# Patient Record
Sex: MALE | Race: WHITE | Employment: FULL TIME | URBAN - METROPOLITAN AREA
[De-identification: names, ages, dates, MRNs, and addresses within clinical notes are randomized per-mention and may not be internally consistent; named-entity substitution may affect disease eponyms.]

---

## 2019-08-16 PROBLEM — M54.30 SCIATICA: Status: ACTIVE | Noted: 2019-08-16

## 2020-10-16 ENCOUNTER — HOSPITAL ENCOUNTER (OUTPATIENT)
Dept: NON INVASIVE DIAGNOSTICS | Age: 70
Discharge: HOME | End: 2020-10-16
Payer: MEDICARE

## 2020-10-16 LAB
LV EF: 58 %
LVEF MODALITY: NORMAL

## 2020-10-16 PROCEDURE — 93306 TTE W/DOPPLER COMPLETE: CPT

## 2021-07-19 ENCOUNTER — HOSPITAL ENCOUNTER (OUTPATIENT)
Dept: PHYSICAL THERAPY | Facility: CLINIC | Age: 71
Setting detail: THERAPIES SERIES
Discharge: HOME | End: 2021-07-19
Payer: MEDICARE

## 2021-07-19 PROCEDURE — 97110 THERAPEUTIC EXERCISES: CPT

## 2021-07-19 PROCEDURE — 97161 PT EVAL LOW COMPLEX 20 MIN: CPT

## 2021-07-19 NOTE — CONSULTS
[] [facility]) [facility] &  Therapy  [address]  [phone]  F: [phone] [] [address]  [facility]   [address]  P: [phone]  F: [phone] [x] [address] &  Therapy  [address]  P: [phone]  F: [phone] [] [address]  P: [phone]  F: [phone] [] [address]  [facility]   [address]   [place]: [phone]  F: [phone]      Physical Therapy Lower Extremity Evaluation    Date:  2021  Patient: Cayetano Hernandez  : 1950  MRN: [id number]  Physician: [address] [place] [address] Avenue [address]: [facility] Medicare  Medical Diagnosis: Pain in (L) hip  Rehab Codes: M25.552  Onset date: 2021  Next Dr's appt.: PRN    Subjective:   CC/HPI: Pt reports that he has had pain in (L) hip and going down (L) leg. Patient reports that he started to notice pain in April when sitting on hard chairs or when driving for long periods of time. Pt reports that pain decreases with getting up and walking around for a few minutes. Pt reports he was concerned that he would need a hip replacement so he scheduled an appointment with Dr. Derrick Landaverde on 2021 and X-rays were taken with negative results. Pt was then referred to physical therapy at that time. Hx of partial knee replacement within (B) knees.      PMHx: [] Unremarkable [] Diabetes [x] HTN  [] Pacemaker   [] MI/Heart Problems [] Cancer [] Arthritis [] Other:              [] Refer to full medical chart  In EPIC       Comorbidities:   [] Obesity [] Dialysis  [] N/A   [] Asthma/COPD [] Dementia [] Other:   [] Stroke [] Sleep apnea [] Other:   [] Vascular disease [] Rheumatic disease [] Other:     Tests: [x] X-Ray:  [] MRI:  [] Other:    Medications: [x] Refer to full medical record [] None [] Other:  Allergies:      [x] Refer to full medical record [] None [] Other:    Function:  Hand Dominance  [] Right  [] Left  Patient lives with: Wife   In what type of home [x]  One story   [] Two story   [] Split level   Number of stairs to enter 4-5    With handrail on the []  Right to enter   [x] Left to enter   Bathroom has a []  Tub only  [] Tub/shower combo   [] Walk in shower    []  Grab bars   Washing machine is on []  Main level   [] Second level   [] Basement   Employer    Job Status [x]  Normal duty   [] Light duty   [] Off due to condition    []  Retired   [] Not employed   [] Disability  [] Other:  []  Return to work:    Work activities/duties Computer job, sitting for long periods       ADL/IADL Previous level of function Current level of function Who currently assists the patient with task   Bathing  [x] Independent  [] Assist [x] Independent  [] Assist    Dress/grooming [x] Independent  [] Assist [x] Independent  [] Assist    Transfer/mobility [x] Independent  [] Assist [x] Independent  [] Assist    Feeding [] Independent  [] Assist [] Independent  [] Assist    Toileting [] Independent  [] Assist [] Independent  [] Assist    Driving [x] Independent  [] Assist [x] Independent  [] Assist Requires multiple stops to stand up and walk around   Housekeeping [x] Independent  [] Assist [x] Independent  [] Assist    Grocery shop/meal prep [x] Independent  [] Assist [x] Independent  [] Assist      Gait Prior level of function Current level of function    [x] Independent  [] Assist [x] Independent  [] Assist   Device: [] Independent [] Independent    [] Straight Cane [] Quad cane [] Straight Cane [] Quad cane    [] Standard walker [] Rolling walker   [] 4 wheeled walker [] Standard walker [] Rolling walker   [] 4 wheeled walker    [] Wheelchair [] Wheelchair     Pain:  [x] Yes  [] No Location: (L) hip    Pain Rating: (0-10 scale) 5-6/10  Pain altered Tx:  [] Yes  [x] No  Action:    Symptoms:  [] (L) hip pain with peripheralization, decreased hip flexor and piriformis mobility and decreased (L) hip strength. These impairments affect the patient's sitting and driving tolerance. Patient would benefit from physical therapy in order to address these impairments to improve overall quality of life. Educated pt on utilizing tennis ball for self mobilization of piriformis when driving for long periods of time. Educated pt on impairments and activity limitations in order for patient to understand pain symptoms. Patient verbalized good understanding of education. Problems:    [x] ? Pain:  [x] ? ROM:  [x] ? Strength:  [x] ? Function:  [] Other:       STG: (to be met in 5 treatments)  1. ? Pain: Pt will report pain at the worst as 2/10 in order to improve sitting tolerance at work and while driving. 2. ? ROM: Pt will improve knee flexion in prone to 120 deg indicating an increase in hip flexor length  3. ? Strength: Patient will improve all (L) hip strength to 4/5 in order to assist with ADLs  4. ? Function: Pt will report being able to sit or drive in car for 30 min with no increase in (L) hip symptoms. 5. Patient to be independent with home exercise program as demonstrated by performance with correct form without cues. 6. Demonstrate Knowledge of fall prevention  LTG: (to be met in 10 treatments)  1. Patient will report being able to sit or drive in car for an hour with no increase in (L) hip symptoms. 2. Patient will demonstrate equal and consistent height with bridging for 10 reps indication increase in hip strength. Patient goals:  \"Eliminate pain while driving\"    Rehab Potential:  [x] Good  [] Fair  [] Poor   Suggested Professional Referral:  [x] No  [] Yes:  Barriers to Goal Achievement:  [x] No  [] Yes:  Domestic Concerns:  [x] No  [] Yes:    Pt. Education:  [x] Plans/Goals, Risks/Benefits discussed  [x] Home exercise program    Method of Education: [x] Verbal  [x] Demo  [x] Written  Comprehension of Education:  [x] Verbalizes understanding. [x] Demonstrates understanding. [x] Needs Review. [x] Demonstrates/verbalizes understanding of HEP/Ed previously given. Treatment Plan:  [x] Therapeutic Exercise   50435  [] Iontophoresis: 4 mg/mL Dexamethasone Sodium Phosphate  mAmin  13754   [x] Therapeutic Activity  42332 [] Vasopneumatic cold with compression  12611    [] Gait Training   59500 [] Ultrasound   41291   [x] Neuromuscular Re-education  83447 [] Electrical Stimulation Unattended  32118   [x] Manual Therapy  01392 [] Electrical Stimulation Attended  87070   [x] Instruction in HEP  [] Lumbar/Cervical Traction  65603   [] Aquatic Therapy   52612 [x] Cold/hotpack    [] Massage   89871      [] Dry Needling, 1 or 2 muscles  04937   [] Biofeedback, first 15 minutes   65389  [] Biofeedback, additional 15 minutes   91834 [] Dry Needling, 3 or more muscles  91883     [x]  Medication allergies reviewed for use of    Dexamethasone Sodium Phosphate 4mg/ml     with iontophoresis treatments. Pt is not allergic. Frequency:  2 x/week for 10 visits        Todays Treatment:  Modalities:   Precautions:  Exercises:  Exercise Reps/ Time Weight/ Level Comments   Figure 4 S 30\"x3     Piriformis S 30\"x3     Supine Hip Flex S 30\"x3           Bridges 2x10     Other:    Specific Instructions for next treatment: Continue with (L) hip and piriformis mobility and (L) hip strengthening.        Evaluation Complexity:  History (Personal factors, comorbidities) [x] 0 [] 1-2 [] 3+   Exam (limitations, restrictions) [x] 1-2 [] 3 [] 4+   Clinical presentation (progression) [x] Stable [] Evolving  [] Unstable   Decision Making [x] Low [] Moderate [] High    [x] Low Complexity [] Moderate Complexity [] High Complexity       Treatment Charges: Mins Units   [x] Evaluation       [x]  Low       []  Moderate       []  High 35 1   []  Modalities     [x]  Ther Exercise 15 2   []  Manual Therapy     []  Ther Activities     []  Aquatics     []  Vasocompression     []  Other       TOTAL TREATMENT TIME: 50    Time in: 10:00   Time Out: 11:00    Electronically signed by: Griselda Drew PT        Physician Signature:________________________________Date:__________________  By signing above or cosigning this note, I have reviewed this plan of care and certify a need for medically necessary rehabilitation services.      *PLEASE SIGN ABOVE AND FAX BACK ALL PAGES*

## 2021-07-22 ENCOUNTER — HOSPITAL ENCOUNTER (OUTPATIENT)
Dept: PHYSICAL THERAPY | Facility: CLINIC | Age: 71
Setting detail: THERAPIES SERIES
Discharge: HOME | End: 2021-07-22
Payer: MEDICARE

## 2021-07-22 PROCEDURE — 97110 THERAPEUTIC EXERCISES: CPT

## 2021-07-22 NOTE — FLOWSHEET NOTE
[] [facility]) - [facility] &  Therapy  [address]  [phone]  F: [phone] [] [address]  [place] [address]   [address]  P: [phone]  F: [phone] [x] [address] &  Therapy  [address]  P: [phone]  F: [phone] [] [address]  P: [phone]  F: [phone] [] [address]  [facility]   [address]   [place]: [phone]  F: [phone]      Physical Therapy Daily Treatment Note    Date:  2021  Patient Name:  Michael Yuan    :  1950  MRN: [id number]   Physician: [address]: [place] Medicare  Medical Diagnosis: Pain in (L) hip                Rehab Codes: M25.552  Onset date: 2021             Next 's appt.: PRN  Visit# / total visits: 2/10     Cancels/No Shows: 0/0    Subjective:    Pain:  [x] Yes  [] No Location: (L) hip Pain Rating: (0-10 scale) 4/10  Pain altered Tx:  [x] No  [] Yes  Action:  Comments: Patient reports that he is feeling good today. Pt was able to complete HEP at home and reports that stretches feel good. Patient continues to report increased pain within the (L) hip and down the (L) LE after about 45 min of driving.       Objective:  Modalities:   Precautions:  Exercises:  Exercise Reps/ Time Weight/ Level Comments   Nustep 10'           SB S 30\"x3     Hip Flexor S 30\"x3     HS Step S 30\"x3     Figure 4 S 30\"x3     Piriformis S 30\"x3           Bridges 2x10     SL Hip Abd 2x10     SL Clamshells 2x10     Prone Hip Ext 2x10     TA iso 5\"x10     Other: Hypervolt to (L) piriformis-5'      Treatment Charges: Mins Units   []  Modalities     [x]  Ther Exercise 50 3   [x]  Manual Therapy 5 -   []  Ther Activities     []  Aquatics     []  Vasocompression     []  Other Total Treatment time 55 3       Assessment: [x] Progressing toward goals. Initiated exercise program during this session per flow sheet listed above in order to improve mobility within the hip, strengthen (L) hip and strengthen core. Followed therapeutic exercise with manual to piriformis utilizing Hypervolt to improve piriformis mobility. VC/TC utilized for TA iso in order to improve core activation with good understanding from patient. Patient reports no increased pain after session at this time. [] No change. [] Other:  [x] Patient would continue to benefit from skilled physical therapy services in order to decrease pain, improve LE strength and increase core strength. STG: (to be met in 5 treatments)  1. ? Pain: Pt will report pain at the worst as 2/10 in order to improve sitting tolerance at work and while driving. 2. ? ROM: Pt will improve knee flexion in prone to 120 deg indicating an increase in hip flexor length  3. ? Strength: Patient will improve all (L) hip strength to 4/5 in order to assist with ADLs  4. ? Function: Pt will report being able to sit or drive in car for 30 min with no increase in (L) hip symptoms. 5. Patient to be independent with home exercise program as demonstrated by performance with correct form without cues. 6. Demonstrate Knowledge of fall prevention  LTG: (to be met in 10 treatments)  1. Patient will report being able to sit or drive in car for an hour with no increase in (L) hip symptoms. 2. Patient will demonstrate equal and consistent height with bridging for 10 reps indication increase in hip strength.        Pt. Education:  [x] Yes  [] No  [x] Reviewed Prior HEP/Ed  Method of Education: [x] Verbal  [] Demo  [] Written  Comprehension of Education:  [x] Verbalizes understanding. [x] Demonstrates understanding. [x] Needs review. [] Demonstrates/verbalizes HEP/Ed previously given. Plan: [x] Continue current frequency toward long and short term goals. [] Specific Instructions for subsequent treatments:       Time In: 10:30 am            Time Out: 11:30    Electronically signed by:  Kendal Warner PT

## 2021-07-26 ENCOUNTER — APPOINTMENT (OUTPATIENT)
Dept: PHYSICAL THERAPY | Facility: CLINIC | Age: 71
End: 2021-07-26
Payer: MEDICARE

## 2021-07-28 ENCOUNTER — HOSPITAL ENCOUNTER (OUTPATIENT)
Dept: PHYSICAL THERAPY | Facility: CLINIC | Age: 71
Setting detail: THERAPIES SERIES
Discharge: HOME | End: 2021-07-28
Payer: MEDICARE

## 2021-07-28 PROCEDURE — 97140 MANUAL THERAPY 1/> REGIONS: CPT

## 2021-07-28 PROCEDURE — 97110 THERAPEUTIC EXERCISES: CPT

## 2021-07-28 NOTE — FLOWSHEET NOTE
[] [facility]) [facility] &  Therapy  [address]  [phone]  F: [phone] [] [address]  [place] [address]   [address]  P: [phone]  F: [phone] [x] [address] &  Therapy  [address]  P: [phone]  F: [phone] [] [address]  P: [phone]  F: [phone] [] [address]  [facility]   [address]   [place]: [phone]  F: [phone]      Physical Therapy Daily Treatment Note    Date:  2021  Patient Name:  Celeste Kerns    :  1950  MRN: [id number]   Physician: [address]: [facility] Medicare  Medical Diagnosis: Pain in (L) hip                Rehab Codes: M25.552  Onset date: 2021             Next 's appt.: PRN  Visit# / total visits: 3/10     Cancels/No Shows: 0/0    Subjective:    Pain:  [x] Yes  [] No Location: (L) hip Pain Rating: (0-10 scale) 4/10  Pain altered Tx:  [x] No  [] Yes  Action:  Comments: Patient reports that he feels there has been some improvement in (L) hip and piriformis pain since beginning therapy. Patient states that he is not feeling any sharp pain down the leg when driving to therapy session, which is about 45 minutes.      Objective:  Modalities:   Precautions:  Exercises:  Exercise Reps/ Time Weight/ Level Comments   Nustep 10'           SB S 30\"x3     Hip Flexor S 30\"x3     HS Step S 30\"x3     Figure 4 S 30\"x3     Piriformis S 30\"x3           Bridges 2x10  HEP, Held this date   SL Hip Abd 2x10  HEP   SL Clamshells 2x10     Prone Hip Ext 2x10     3-way hip x10ea Lime    Step ups: Fwd/Lat      TA iso 5\"x10  Held this date   Other: Hypervolt to (L) piriformis-10'      Treatment Charges: Mins Units   []  Modalities     [x]  Ther Exercise 45 3   [x] Manual Therapy 10 1   []  Ther Activities     []  Aquatics     []  Vasocompression     []  Other     Total Treatment time 55 4       Assessment: [x] Progressing toward goals. Continued with current exercise program per flowsheet listed above. Began session with Nustep to warm up muscles and increase blood flow. Followed with stretching to improve mobility of (B) hips and reduce pain. Continued with manual therapy via the Hypervolt to the (L) piriformis and posterior hip in order to improve mobility within the piriformis. Initiated standing LE strengthening ex during this session to improve stability within (L) hip. Patient tolerated added ex well with reports of increased fatigue at end of session. [] No change. [] Other:  [x] Patient would continue to benefit from skilled physical therapy services in order to decrease pain, improve LE strength and increase core strength. STG: (to be met in 5 treatments)  1. ? Pain: Pt will report pain at the worst as 2/10 in order to improve sitting tolerance at work and while driving. 2. ? ROM: Pt will improve knee flexion in prone to 120 deg indicating an increase in hip flexor length  3. ? Strength: Patient will improve all (L) hip strength to 4/5 in order to assist with ADLs  4. ? Function: Pt will report being able to sit or drive in car for 30 min with no increase in (L) hip symptoms. 5. Patient to be independent with home exercise program as demonstrated by performance with correct form without cues. 6. Demonstrate Knowledge of fall prevention  LTG: (to be met in 10 treatments)  1. Patient will report being able to sit or drive in car for an hour with no increase in (L) hip symptoms. 2. Patient will demonstrate equal and consistent height with bridging for 10 reps indication increase in hip strength.        Pt.  Education:  [x] Yes  [] No  [x] Reviewed Prior HEP/Ed  Method of Education: [x] Verbal  [] Demo  [] Written    Updated HEP during this session, adding mat hip strengthening exercises with good understanding from patient. Comprehension of Education:  [x] Verbalizes understanding. [x] Demonstrates understanding. [x] Needs review. [] Demonstrates/verbalizes HEP/Ed previously given. Plan: [x] Continue current frequency toward long and short term goals.     [] Specific Instructions for subsequent treatments:       Time In: 8:00 am            Time Out: 9:00 am    Electronically signed by:  Dane Elizalde PT

## 2021-08-02 ENCOUNTER — HOSPITAL ENCOUNTER (OUTPATIENT)
Dept: PHYSICAL THERAPY | Facility: CLINIC | Age: 71
Setting detail: THERAPIES SERIES
Discharge: HOME | End: 2021-08-02
Payer: MEDICARE

## 2021-08-02 PROCEDURE — 97110 THERAPEUTIC EXERCISES: CPT

## 2021-08-02 NOTE — FLOWSHEET NOTE
[] [facility]) - [facility] &  Therapy  [address]  [phone]  F: [phone] [] [address]  [place] [address]   [address]  P: [phone]  F: [phone] [x] [address] &  Therapy  [address]  P: [phone]  F: [phone] [] [address]  P: [phone]  F: [phone] [] [address]  [facility]   [address]   [place]: [phone]  F: [phone]      Physical Therapy Daily Treatment Note    Date:  2021  Patient Name:  Zhao Garber    :  1950  MRN: [id number]   Physician: [address]: [facility] Medicare  Medical Diagnosis: Pain in (L) hip                Rehab Codes: M25.552  Onset date: 2021             Next 's appt.: PRN  Visit# / total visits: 4/10     Cancels/No Shows: 0/0    Subjective:    Pain:  [] Yes  [x] No Location: (L) hip Pain Rating: (0-10 scale) 0/10  Pain altered Tx:  [x] No  [] Yes  Action:  Comments: Patient reports no increased pain after driving 45 min to clinic today. States that he has some discomfort, however it is not as bad as it has been.      Objective:  Modalities:   Precautions:  Exercises:  Exercise Reps/ Time Weight/ Level Comments   Nustep 10'           SB S 30\"x3     Hip Flexor S 30\"x3     HS Step S 30\"x3     Figure 4 S 30\"x3     Piriformis S 30\"x3           Bridges 2x10  HEP, Held this date   SL Hip Abd 2x10  HEP   SL Clamshells 2x10  HEP   Prone Hip Ext 2x10  HEP   3-way hip x10ea [address]ime    Step ups: Fwd/Lat x15 ea           TA iso 10\"x10     TA iso + marches  x10     TA iso + bent knee fall out x10                 Other: Hypervolt to (L) piriformis-not performed on this date      Treatment Charges: Mins Units   []  Modalities     [x]  Ther Exercise 50 3 Verbal  [] Demo  [] Written    Updated HEP during this session, adding mat hip strengthening exercises with good understanding from patient. Comprehension of Education:  [x] Verbalizes understanding. [x] Demonstrates understanding. [x] Needs review. [] Demonstrates/verbalizes HEP/Ed previously given. Plan: [x] Continue current frequency toward long and short term goals.     [] Specific Instructions for subsequent treatments:       Time In: 8:00 am            Time Out: 9:00 am    Electronically signed by:  Gabe Martinez PT

## 2021-08-04 ENCOUNTER — HOSPITAL ENCOUNTER (OUTPATIENT)
Dept: PHYSICAL THERAPY | Facility: CLINIC | Age: 71
Setting detail: THERAPIES SERIES
Discharge: HOME | End: 2021-08-04
Payer: MEDICARE

## 2021-08-04 PROCEDURE — 97530 THERAPEUTIC ACTIVITIES: CPT

## 2021-08-04 PROCEDURE — 97110 THERAPEUTIC EXERCISES: CPT

## 2021-08-04 NOTE — FLOWSHEET NOTE
[] [facility]) - [facility] &  Therapy  [address]  [phone]  F: [phone] [] [address]  [place] [address]   [address]  P: [phone]  F: [phone] [x] [address] &  Therapy  [address]  P: [phone]  F: [phone] [] [address]  P: [phone]  F: [phone] [] [address]  [facility]   [address]   [place]: [phone]  F: [phone]      Physical Therapy Daily Treatment Note/Progress Note    Date:  2021  Patient Name:  Cayetano Hernandez    :  1950  MRN: [id number]   Physician: [address]: [place] [employer]  Medical Diagnosis: Pain in (L) hip                Rehab Codes: M25.552  Onset date: 2021             Next Dr's appt.: PRN  Visit# / total visits: 5/10     Cancels/No Shows: 0/0    Subjective:    Pain:  [] Yes  [x] No Location: (L) hip Pain Rating: (0-10 scale) 0/10  Pain altered Tx:  [x] No  [] Yes  Action:  Comments: Patient reports that he feels he is getting better since beginning physical therapy. Patient reports that he can notice his hip after driving 45 min to 1 hour, however pain has decreased compared to first coming to therapy. Objective:    ROM  ° A/P STRENGTH TESTS (+/-) Left Right Not Tested     Left Right Left Right Ant.  Drawer     []?    Hip Flex 100 100 4/5 5/5 Post. Drawer     []?    Ext 10 10 4+/5 4+/5 Lachmans     []?    ER         Valgus Stress     []?    IR         Varus Stress     []?    ABD 30 30 4+/5 5/5 Polas     []?    ADD         Apleys Comp.     []?    Knee Flex 125 120 4+/5 5/5 Apleys Dist.     []?    Ext     5/5 5/5 Hip Scouring (-) (-) []?    Ankle DF 5 5  JEFFREYs (-) (-) []?    PF 5 5     Piriformis (-) (-) []?    INV         Fabios     []?    EVER         Talor Tilt     []?              Pat-Fem Grind     []?              Ely's (+) 115 deg knee flx (+) 110 deg knee flx         Modalities:   Precautions:  Exercises:   Exercise Reps/ Time Weight/ Level Exercise Performed  Comments   Nustep 10'   x                SB S 30\"x3        Hip Flexor S 30\"x3        HS Step S 30\"x3        Figure 4 S 30\"x3   x     Piriformis S 30\"x3   x                Bridges 2x10   x HEP   SL Hip Abd 2x10  Lime x HEP   SL Clamshells 2x10  Lime x HEP   Prone Hip Ext 2x10   x HEP   3-way hip 2x10ea Lime x     Step ups: Fwd/Lat x15 ea  8' x     TG SL Squat x20 L20 x           TA iso 10\"x10        TA iso + marches  x10        TA iso + bent knee fall out x10            Other: Hypervolt to (L) piriformis-not performed on this date      Treatment Charges: Mins Units   []  Modalities     [x]  Ther Exercise 40 3   []  Manual Therapy     [x]  Ther Activities 15 1   []  Aquatics     []  Vasocompression     []  Other     Total Treatment time 55 4       Assessment: [x] Progressing toward goals. Re-assessed patient's goals during this session with good progress being made. Patient met 4/6 short term goals and 0/2 long term goals. Patient has demonstrated improvements in (B) hip strength, (B) hip flexor length, pain tolerance and sitting tolerance. Patient would continue to benefit from skilled physical therapy in order to continue to improve (B) hip strength, (B) hip flexor length, sitting tolerance, sore strength and functional hip strength. Continued with current POC per flowsheet listed above. Began session with Nustep in order to increase blood flow and warm up muscles. Followed with piriformis stretching to increase piriformis length. Progressed (B) hip strengthening ex by increasing resistance with mat ex and adding TG SL squat. Patient tolerated added resistance and ex well with no increase in (L) hip pain.  Plan to continue per POC with (L) hip and complete re-evaluation at next appointment for (R) shoulder. [] No change. [] Other:  [x] Patient would continue to benefit from skilled physical therapy services in order to decrease pain, improve LE strength and increase core strength. STG: (to be met in 5 treatments)  1. ? Pain: Pt will report pain at the worst as 2/10 in order to improve sitting tolerance at work and while driving. (7/1/63: Patient reports pain at worst as 2-3/10 when driving for long periods of time.) (MET)  2. ? ROM: Pt will improve knee flexion in prone to 120 deg indicating an increase in hip flexor length (8/4/21: Patient demonstrates 115 deg of (R) knee flex in prone and 110 deg of (L) knee flex in prone) (NOT MET)  3. ? Strength: Patient will improve all (L) hip strength to 4/5 in order to assist with ADLs (8/4/21: Patient has improved (B) hip strength to at least 4/5) (MET)  4. ? Function: Pt will report being able to sit or drive in car for 30 min with no increase in (L) hip symptoms. (8/4/21: Patient reports being able to sit in car up to 45 min without pain) (MET)  5. Patient to be independent with home exercise program as demonstrated by performance with correct form without cues. (8/4/21: Patient reports he completes HEP 2-3x/week) (Ongoing)  6. Demonstrate Knowledge of fall prevention (MET)  LTG: (to be met in 10 treatments)  1. Patient will report being able to sit or drive in car for an hour with no increase in (L) hip symptoms. 2. Patient will demonstrate equal and consistent height with bridging for 10 reps indication increase in hip strength. (8/4/21: Patient continues to demonstrate decreased bridge height with increased repetitions) (NOT MET)       Pt. Education:  [x] Yes  [] No  [x] Reviewed Prior HEP/Ed  Method of Education: [x] Verbal  [] Demo  [] Written    Updated HEP during this session, adding mat hip strengthening exercises with good understanding from patient. Comprehension of Education:  [x] Verbalizes understanding.   [x] Demonstrates understanding. [x] Needs review. [] Demonstrates/verbalizes HEP/Ed previously given. Plan: [x] Continue current frequency toward long and short term goals.     [] Specific Instructions for subsequent treatments:       Time In: 9:05 am            Time Out: 10:00 am    Electronically signed by:  Liseth Chandra PT

## 2021-08-11 ENCOUNTER — APPOINTMENT (OUTPATIENT)
Dept: PHYSICAL THERAPY | Facility: CLINIC | Age: 71
End: 2021-08-11
Payer: MEDICARE

## 2021-08-16 ENCOUNTER — HOSPITAL ENCOUNTER (OUTPATIENT)
Dept: PHYSICAL THERAPY | Facility: CLINIC | Age: 71
Setting detail: THERAPIES SERIES
Discharge: HOME | End: 2021-08-16
Payer: MEDICARE

## 2021-08-16 PROCEDURE — 97110 THERAPEUTIC EXERCISES: CPT

## 2021-08-16 PROCEDURE — 97161 PT EVAL LOW COMPLEX 20 MIN: CPT

## 2021-08-16 NOTE — FLOWSHEET NOTE
X-Ray: [] MRI:  [] Other:    Medications: [x] Refer to full medical record [] None [] Other:  Allergies:      [x] Refer to full medical record [] None [] Other:    Function:  Hand Dominance  []? Right  []? Left  Patient lives with: Wife   In what type of home [x]? One story   []? Two story   []? Split level   Number of stairs to enter 4-5    With handrail on the []? Right to enter   [x]? Left to enter   Bathroom has a []? Tub only  []? Tub/shower combo   []? Walk in shower    []? Grab bars   Washing machine is on []? Main level   []? Second level   []? Basement   Employer     Job Status [x]? Normal duty   []? Light duty   []? Off due to condition    []? Retired   []? Not employed   []? Disability  []? Other:  []? Return to work:    Work activities/duties Computer job, sitting for long periods            ADL/IADL Previous level of function Current level of function Who currently assists the patient with task   Bathing  [x] Independent  [] Assist [x] Independent  [] Assist    Dress/grooming [x] Independent  [] Assist [x] Independent  [] Assist    Transfer/mobility [x] Independent  [] Assist [x] Independent  [] Assist    Feeding [x] Independent  [] Assist [x] Independent  [] Assist    Toileting [x] Independent  [] Assist [x] Independent  [] Assist    Driving [x] Independent  [] Assist [x] Independent  [] Assist Patient reports (R) shoulder pain after 20 min of driving    Housekeeping [x] Independent  [] Assist [x] Independent  [] Assist    Grocery shop/meal prep [x] Independent  [] Assist [x] Independent  [] Assist        Pain:  [x] Yes  [] No Location: (R) shoulder Pain Rating: (0-10 scale) 3-4/10  Pain altered Tx:  [] Yes  [x] No  Action:    Symptoms:  [] Improving [x] Worsening [] Same  Better:  [] AM    [] PM    [] Sit    [] Rise/Sit    []Stand    [] Walk    [] Lying    [x] Other: Resting   Worse: [] AM    [] PM    [] Sit    [] Rise/Sit    []Stand    [] Walk    [] Lying    [] Bend                      [] Valsalva    [x] Other: Driving for long periods of time   Sleep: [x] OK    [] Disturbed    Objective:     ROM  °A/P END FEEL STRENGTH    Left Right  Left Right   Shoulder Flex 150 140  5/5 4+/5   Ext         175  5/5 5/5   ER @ 0 45 90 WFL WFL  4/5 3+/5   IR WFL WFL  5/5 5/5   Supraspinatus    5/5 4+/5   Infraspinatus    4/5 3+/5   Serratus Ant    5/5 4+/5   Pectoralis        Lats        Mid Trap    4+/5 3+/5   Lower Trap    3+/5 3+/5   Elbow Flex. [facility] WFL  4/5 4/5   Elbow Ext. [facility]  4/5 4/5   Wrist Flex. [facility]  5/5 5/5   Wrist Ext. [facility] WF  5/5 5/5               OBSERVATION No Deficit Deficit Not Tested Comments   Forward Head [] [x] []    Rounded Shoulders [] [x] []    Kyphosis [] [x] []    Scapular Height/Position [] [x] [] (R) scapula elevated and upwardly rotated    Winging [x] [] []    Scapulohumeral Rhythm [] [x] []    INSPECTION/PALPATION       SC/AC Joint [x] [] []    Supraspinatus [x] [] []    Biceps tendon/groove [x] [] []    Posterior shld [] [x] [] Increased tension palpated within (R) UT and middle trap and lower trap   Subscapularis [x] [] []    NEUROLOGICAL       Cervical ROM/Quadrant [] [x] [] Flex: 20 deg, Ext: 25 deg, (R) rot: 55 (L) Rot: 50 (R) SB: 15, (L) SB: 15   Reflexes [x] [] []    Compression/Distraction [x] [] []    Sensation [x] [] []      Functional Test: UEFI Score: 3% functionally impaired     Comments:    Assessment:  Patient is a 66year old male who presents to physical therapy with increased pain in (R) shoulder, decreased cervical ROM and decreased shoulder and scapular strength. These impairments affect the patient's ability to drive greater than 20 min without (R) shoulder pain. Patient would benefit from skilled physical therapy in order to improve impairments and overall quality of life. Educated patient on findings and POC with good understanding verbalized by patient. Problems:    [x] ? Pain:  [x] ? ROM:  [x] ? Strength:  [x] ?  Function:  [] Other: STG: (to be met in 5 treatments)  1. ? Pain: Patient will report pain as 0/10 in order to perform ADLs with ease   2. ? ROM: Patient will improve (B) cervical SB ROM to 45 deg to indicate improved length of (B) UT  3. Patient to be independent with home exercise program as demonstrated by performance with correct form without cues. LTG: (to be met in 10 treatments)  1. Patient will improve UEFI to 0% in order to improve overall quality of life  2. ? Function: Patient will report being able to drive up to 45 min without pain in (R) shoulder in order to improve driving tolerance. 3. ? Strength: Patient will improve (B) shoulder and scapular stabilizer strength to 4+/5 in order to improve driving tolerance of (R) shoulder                       Patient goals: \"Finding out what is wrong with shoulder, relieving pain\"    Rehab Potential:  [x] Good  [] Fair  [] Poor   Suggested Professional Referral:  [x] No  [] Yes:  Barriers to Goal Achievement:  [x] No  [] Yes:  Domestic Concerns:  [x] No  [] Yes:    Pt. Education:  [x] Plans/Goals, Risks/Benefits discussed  [x] Home exercise program  Access Code: [email or web address]  URL: [email or web address]  Date: 08/16/2021  Prepared by: Kym Nichole    Exercises  Seated Upper Trapezius Stretch - 2 x daily - 7 x weekly - 3 reps - 30 hold  Seated Levator Scapulae Stretch - 2 x daily - 7 x weekly - 3 reps - 30 sec hold  Prone Single Arm Shoulder Y - 2 x daily - 5 x weekly - 3 sets - 10 reps  Prone Shoulder Horizontal Abduction - 2 x daily - 5 x weekly - 3 sets - 10 reps  Prone Shoulder Flexion - 2 x daily - 5 x weekly - 3 sets - 10 reps  Sidelying Shoulder External Rotation - 2 x daily - 5 x weekly - 3 sets - 10 reps    Method of Education: [x] Verbal  [x] Demo  [x] Written  Comprehension of Education:  [x] Verbalizes understanding. [x] Demonstrates understanding. [x] Needs Review. [] Demonstrates/verbalizes understanding of HEP/Ed previously given.     Treatment Plan:  [x] Therapeutic Exercise   68052  [] Iontophoresis: 4 mg/mL Dexamethasone Sodium Phosphate  mAmin  75888   [x] Therapeutic Activity  14521 [x] Vasopneumatic cold with compression  45350    [] Gait Training   05389 [] Ultrasound   25293   [] Neuromuscular Re-education  27728 [] Electrical Stimulation Unattended  19175   [x] Manual Therapy  90336 [] Electrical Stimulation Attended  82002   [x] Instruction in HEP  [] Lumbar/Cervical Traction  03569   [] Aquatic Therapy   94241 [x] Cold/hotpack    [] Massage   74716      [] Dry Needling, 1 or 2 muscles  29124   [] Biofeedback, first 15 minutes   09699  [] Biofeedback, additional 15 minutes   64135 [] Dry Needling, 3 or more muscles  62337     [x]  Medication allergies reviewed for use of    Dexamethasone Sodium Phosphate 4mg/ml     with iontophoresis treatments. Pt is not allergic.        Frequency: 2x/week for 10 visits    Todays Treatment:  Modalities:   Precautions:  Exercises:  Exercise Reps/ Time Weight/ Level Comments   UT S 3x30\"     Levator Scap S 3x30\"           SL ER  x15  Slow to complete   IYT x15  Slow to complete   Other:    Specific Instructions for next treatment:      Evaluation Complexity:  History (Personal factors, comorbidities) [x] 0 [] 1-2 [] 3+   Exam (limitations, restrictions) [x] 1-2 [] 3 [] 4+   Clinical presentation (progression) [x] Stable [] Evolving  [] Unstable   Decision Making [x] Low [] Moderate [] High    [x] Low Complexity [] Moderate Complexity [] High Complexity       Treatment Charges: Mins Units   [x] Evaluation       [x]  Low       []  Moderate       []  High 30 1   []  Modalities     [x]  Ther Exercise 20 1   []  Manual Therapy     []  Ther Activities     []  Aquatics     []  Vasocompression     []  Other       TOTAL TREATMENT TIME: 50 min    Time in: 9:05 am    Time Out: 10:00 am    Electronically signed by: Daniel Laguans PT        Physician Signature:________________________________Date:__________________  By signing above or cosigning this note, I have reviewed this plan of care and certify a need for medically necessary rehabilitation services.      *PLEASE SIGN ABOVE AND FAX BACK ALL PAGES*

## 2021-08-18 ENCOUNTER — HOSPITAL ENCOUNTER (OUTPATIENT)
Dept: PHYSICAL THERAPY | Facility: CLINIC | Age: 71
Setting detail: THERAPIES SERIES
Discharge: HOME | End: 2021-08-18
Payer: MEDICARE

## 2021-08-18 PROCEDURE — 97110 THERAPEUTIC EXERCISES: CPT

## 2021-08-18 NOTE — FLOWSHEET NOTE
[] [facility]) - [facility] &  Therapy  [address]  [phone]  F: [phone] [] [address]  [address]   [address]  P: [phone]  F: [phone] [x] [address] &  Therapy  [address]  P: [phone]  F: [phone] [] [address]  P: [phone]  F: [phone] [] [address]  [facility]   [address]   [place]: [phone]  F: [phone]      Physical Therapy Daily Treatment Note/Progress Note    Date:  2021  Patient Name:  Alirio Kraft    :  1950  MRN: [id number]   Physician: [address]: [facility] Medicare  Medical Diagnosis: Pain in (L) hip                Rehab Codes: M25.552  Onset date: 2021             Next 's appt.: PRN  Visit# / total visits: 6/10 (L) hip, 2/10 (R) shoulder     Cancels/No Shows: 0/0    Subjective:    Pain:  [] Yes  [x] No Location: (L) hip Pain Rating: (0-10 scale) 0/10  Pain altered Tx:  [x] No  [] Yes  Action:  Comments: Patient reports that he was able to drive 45 min to therapy with no pain in the (R) shoulder or (L) hip. Patient states he plans to drive 2 1/2 hours this weekend and will gauge his pain during the drive.    Objective:   Modalities:   Precautions:  Exercises:   Exercise Reps/ Time Weight/ Level Exercise Performed  Comments   Nustep 10'   x                SB S 30\"x3   x     Hip Flexor S 30\"x3        HS Step S 30\"x3   x     Figure 4 S 30\"x3   x     Piriformis S 30\"x3   x                Bridges 2x10    HEP   SL Hip Abd 2x10  Lime  HEP   SL Clamshells 2x10  Lime  HEP   Prone Hip Ext 2x10    HEP   3-way hip 2x15ea blue x     Step ups: Fwd/Lat x15 ea  8' x     TG SL Squat x20 L20 x    Resisted side stepping  3L Lime x // bars          TA iso 10\"x10        TA iso + marches  x10        TA iso + bent knee fall out x10        (R) Shoulder       UT S 3x30\"      Levator Scap S 3x30\"             SL ER x10 2lbs     IYT x10      TB Rows       TB shoulder ext           Other: Hypervolt to (L) piriformis-not performed on this date      Treatment Charges: Mins Units   []  Modalities     [x]  Ther Exercise 58 4   []  Manual Therapy     []  Ther Activities     []  Aquatics     []  Vasocompression     []  Other     Total Treatment time 58 4       Assessment: [x] Progressing toward goals. Continued with current POC during this session for (L) hip and initiated ex program for (R) shoulder. Began session with Scifit utilizing both UE/LE to warm up muscles. Followed with stretching to (B) LE for increased mobility. Continued with standing LE strengthening ex durng this session to improve (L) hip stability. Added resisted side stepping to continue to improve hip abduction strength. Patient tolerated all ex well. Reviewed HEP for (R) shoulder during this session. Plan to progress scapular strengthening during next session. Patient reports he will be completing a 2 1/2 hour trip in the car this weekend. Plan to have patient complete the trip and reassess (L) hip goals during next session. [] No change. [] Other:  [x] Patient would continue to benefit from skilled physical therapy services in order to decrease pain, improve LE strength and increase core strength.    (L) hip  STG: (to be met in 5 treatments)  1. ? Pain: Pt will report pain at the worst as 2/10 in order to improve sitting tolerance at work and while driving. ([birth year]: Patient reports pain at worst as 2-3/10 when driving for long periods of time.) (MET)  2. ? ROM: Pt will improve knee flexion in prone to 120 deg indicating an increase in hip flexor length (8/4/21: Patient demonstrates 115 deg of (R) knee flex in prone and 110 deg of (L) knee flex in prone) (NOT MET)  3. ? Strength: Patient will improve all (L) hip strength to 4/5 in order to assist with ADLs (8/4/21: Patient has improved (B) hip strength to at least 4/5) (MET)  4. ? Function: Pt will report being able to sit or drive in car for 30 min with no increase in (L) hip symptoms. (8/4/21: Patient reports being able to sit in car up to 45 min without pain) (MET)  5. Patient to be independent with home exercise program as demonstrated by performance with correct form without cues. (8/4/21: Patient reports he completes HEP 2-3x/week) (Ongoing)  6. Demonstrate Knowledge of fall prevention (MET)  LTG: (to be met in 10 treatments)  1. Patient will report being able to sit or drive in car for an hour with no increase in (L) hip symptoms. 2. Patient will demonstrate equal and consistent height with bridging for 10 reps indication increase in hip strength. (8/4/21: Patient continues to demonstrate decreased bridge height with increased repetitions) (NOT MET)    (R) Shoulder  STG: (to be met in 5 treatments)  1. ? Pain: Patient will report pain as 0/10 in order to perform ADLs with ease   2. ? ROM: Patient will improve (B) cervical SB ROM to 45 deg to indicate improved length of (B) UT  3. Patient to be independent with home exercise program as demonstrated by performance with correct form without cues. LTG: (to be met in 10 treatments)  1. Patient will improve UEFI to 0% in order to improve overall quality of life  2. ? Function: Patient will report being able to drive up to 45 min without pain in (R) shoulder in order to improve driving tolerance. 3. ? Strength: Patient will improve (B) shoulder and scapular stabilizer strength to 4+/5 in order to improve driving tolerance of (R) shoulder          Pt. Education:  [x] Yes  [] No  [x] Reviewed Prior HEP/Ed  Method of Education: [x] Verbal  [] Demo  [] Written    Updated HEP during this session, adding mat hip strengthening exercises with good understanding from patient.    Comprehension of Education:  [x] Verbalizes understanding. [x] Demonstrates understanding. [x] Needs review. [] Demonstrates/verbalizes HEP/Ed previously given. Plan: [x] Continue current frequency toward long and short term goals.     [] Specific Instructions for subsequent treatments:       Time In: 9:00 am            Time Out: 10:00 am    Electronically signed by:  Kym Nichole PT

## 2021-08-23 ENCOUNTER — HOSPITAL ENCOUNTER (OUTPATIENT)
Dept: PHYSICAL THERAPY | Facility: CLINIC | Age: 71
Setting detail: THERAPIES SERIES
Discharge: HOME | End: 2021-08-23
Payer: MEDICARE

## 2021-08-23 PROCEDURE — 97530 THERAPEUTIC ACTIVITIES: CPT

## 2021-08-23 PROCEDURE — 97110 THERAPEUTIC EXERCISES: CPT

## 2021-08-23 NOTE — DISCHARGE SUMMARY
[] [address] [facility]  [facility]  [address]  [phone]  F: [phone] [] [address]  [facility]   [address]  P: [phone]  F: [phone] [x] [address] &  Therapy  [address]  P: [phone]  F: [phone] [] [address]  P: [phone]  F: [phone] [] [address]  [facility]   [address]   [place]: [phone]  F: [phone]      Physical Therapy Daily Treatment Note/Discharge Summary    Date:  2021  Patient Name:  Andrey Wu    :  1950  MRN: [id number]   Physician: [address]: [facility] Medicare  Medical Diagnosis: Pain in (L) hip, Adhesive Capsulitis of (R) shoulder             Rehab Codes: M25.552, M25.51  Onset date: 2021             Next 's appt.: PRN  Visit# / total visits: 7/10 (L) hip, 2/10 (R) shoulder     Cancels/No Shows: 0/0    Subjective:    Pain:  [] Yes  [x] No Location: (L) hip Pain Rating: (0-10 scale) 0/10   Pain altered Tx:  [x] No  [] Yes  Action:  Comments: Patient reports that he took a 2 1/2 hour drive over the weekend. Patient reports he required one stop due to stiffness in the (L) hip, however no pain peripheralizing into (L) LE noted.    Objective:   Modalities:   Precautions:  Exercises:   Exercise Reps/ Time Weight/ Level Exercise Performed  Comments   Nustep 10'                   SB S 30\"x3   x     Hip Flexor S 30\"x3   x     HS Step S 30\"x3   x     Figure 4 S 30\"x3   x     Piriformis S 30\"x3   x                Bridges 2x10   x HEP   SL Hip Abd 2x10  Lime  HEP   SL Clamshells 2x10  Lime  HEP   Prone Hip Ext 2x10    HEP   3-way hip 2x15ea blue x     Step ups: Fwd/Lat x15 ea  8'      TG SL Squat x20 L20     Resisted side stepping  3L Lime  // bars          TA iso 10\"x10   x     TA iso + marches  x10   x     TA iso + bent knee fall out x10   x     (R) Shoulder       UT S 3x30\"      Levator Scap S 3x30\"             SL ER x10 2lbs     IYT x10      TB Rows       TB shoulder ext           Other: [employer] to (L) piriformis-not performed on this date      Treatment Charges: Mins Units   []  Modalities     [x]  Ther Exercise 40 2   []  Manual Therapy     [x]  Ther Activities 12 1   []  Aquatics     []  Vasocompression     []  Other     Total Treatment time 52 3       Assessment: [x] Progressing toward goals. Reassessed patients goals during this session with good improvement. Patient met 5/6 short term goals and 2/2 long term goals. Patient demonstrates improvements in (B) hip strength, pain tolerance, sitting tolerance and knowledge in fall prevention. Patient continues to demonstrate a decrease in hip flexor length, however patient understand stretches and ways to manage hip flexor tightness. Patient to be discharged at this time due to meeting 7/8 goals. Plan to focus on (R) should pain in future appointments. Updated patients HEP during this session with handout and TB provided in order for patient to continue (L) hip strengthening and mobility exercises at home. Patient verbalized and demonstrated good understanding of updated HEP. Continued with (L) hip strengthening and mobility POC in order to decrease pain. No increase in pain noted at end of session. [] No change. [] Other:  [x] Patient would continue to benefit from skilled physical therapy services in order to decrease pain, improve LE strength and increase core strength.    (L) hip  STG: (to be met in 5 treatments)  1. ? Pain: Pt will report pain at the worst as 2/10 in order to improve sitting tolerance at work and while driving. (8/2/94: Patient reports pain at worst as 2/10 when driving for long periods of time.) (MET)  2. ? ROM: Pt will improve knee flexion in prone to 120 deg indicating an increase in hip flexor length (8/23/21: Patient demonstrates 115 deg of (R) knee flex in prone and 110 deg of (L) knee flex in prone) (NOT MET)  3. ? Strength: Patient will improve all (L) hip strength to 4/5 in order to assist with ADLs (8/4/21: Patient has improved (B) hip strength to at least 4/5) (MET)   4. ? Function: Pt will report being able to sit or drive in car for 30 min with no increase in (L) hip symptoms. (8/4/21: Patient reports being able to sit in car up to 45 min without pain) (MET)  5. Patient to be independent with home exercise program as demonstrated by performance with correct form without cues. (8/23/21: Patient reports he completes HEP 2-3x/week) (MET)  6. Demonstrate Knowledge of fall prevention (MET)  LTG: (to be met in 10 treatments)  1. Patient will report being able to sit or drive in car for an hour with no increase in (L) hip symptoms. (8/23/21: Patient reports that he is able to drive 2.5 hours without pain, however requires rest break due to increase stiffness in the (L) hip) (MET)  2. Patient will demonstrate equal and consistent height with bridging for 10 reps indication increase in hip strength. (8/23/21: Patient demonstrates equal and consistent height with bridging on this date indicating an increase in (L) hip strength) (MET)    (R) Shoulder  STG: (to be met in 5 treatments)  1. ? Pain: Patient will report pain as 0/10 in order to perform ADLs with ease   2. ? ROM: Patient will improve (B) cervical SB ROM to 45 deg to indicate improved length of (B) UT  3. Patient to be independent with home exercise program as demonstrated by performance with correct form without cues. LTG: (to be met in 10 treatments)  1. Patient will improve UEFI to 0% in order to improve overall quality of life  2. ? Function: Patient will report being able to drive up to 45 min without pain in (R) shoulder in order to improve driving tolerance.    3. ? Strength: Patient will improve (B) shoulder and scapular stabilizer strength to 4+/5 in order to improve driving tolerance of (R) shoulder          Pt. Education:  [x] Yes  [] No  [x] Reviewed Prior HEP/Ed  Method of Education: [x] Verbal  [] Demo  [] Written  Access Code: [email or web address]  URL: [email or web address]  Date: 08/23/2021  Prepared by: Katie Peter    Exercises  Supine Transversus Abdominis Bracing - Hands on Stomach - 2 x daily - 7 x weekly - 10 reps - 10 sec hold  Supine March - 2 x daily - 7 x weekly - 2 sets - 10 reps  Bent Knee Fallouts - 2 x daily - 7 x weekly - 2 sets - 10 reps  Bird Dog - 2 x daily - 7 x weekly - 2 sets - 10 reps  Standing Repeated Hip Flexion with Resistance - 2 x daily - 7 x weekly - 3 sets - 10 reps  Standing Hip Abduction with Theraband Resistance - 2 x daily - 7 x weekly - 3 sets - 10 reps  Standing Hip Extension with Anchored Resistance - 2 x daily - 7 x weekly - 3 sets - 10 reps  Step Up - 2 x daily - 7 x weekly - 3 sets - 10 reps  Lateral Step Up - 2 x daily - 7 x weekly - 3 sets - 10 reps  Sit to Stand - 2 x daily - 7 x weekly - 3 sets - 10 reps  Side Stepping with Resistance at Ankles - 2 x daily - 7 x weekly - 3 sets - 10 reps    Updated HEP during this session, adding mat hip strengthening exercises with good understanding from patient. Comprehension of Education:  [x] Verbalizes understanding. [x] Demonstrates understanding. [x] Needs review. [] Demonstrates/verbalizes HEP/Ed previously given. Plan: [] Continue current frequency toward long and short term goals. [x] Specific Instructions for subsequent treatments: Patient to be discharged from (L) hip physical therapy program due to meeting all goals and reporting improvement in overall quality of life. Plan to fous on (R) shoulder in future sessions.        Time In: 9:08 am            Time Out: 10:00 am    Electronically signed by:  Katie Peter, PT

## 2021-08-25 ENCOUNTER — HOSPITAL ENCOUNTER (OUTPATIENT)
Dept: PHYSICAL THERAPY | Facility: CLINIC | Age: 71
Setting detail: THERAPIES SERIES
Discharge: HOME | End: 2021-08-25
Payer: MEDICARE

## 2021-08-25 PROCEDURE — 97110 THERAPEUTIC EXERCISES: CPT

## 2021-08-25 NOTE — DISCHARGE SUMMARY
[]  Modalities     [x]  Ther Exercise 48 3   []  Manual Therapy     [x]  Ther Activities     []  Aquatics     []  Vasocompression     []  Other     Total Treatment time 48 3       Assessment: [x] Progressing toward goals. Began session on UBE in order to warm up muscles. Continued with current shoulder POC per flowsheet above. Progressed scapular and shoulder strengthening ex on this  Date per flowsheet with good tolerance and no increase in (R) shoulder pain. Updated patient's HEP with added ex in order to continue to improve scapular and shoulder strength at home. Discussed with patient decreasing frequency of visits to 1x/week in order to accommodate patient's work schedule and due to improvements in (R) shoulder pain with driving. Patient agreed with discussion and will continue physical therapy 1x/week. [] No change. [] Other:  [x] Patient would continue to benefit from skilled physical therapy services in order to decrease pain, improve UE/ scapular stabilizer strength    STG: (to be met in 5 treatments)  1. ? Pain: Patient will report pain as 0/10 in order to perform ADLs with ease   2. ? ROM: Patient will improve (B) cervical SB ROM to 45 deg to indicate improved length of (B) UT  3. Patient to be independent with home exercise program as demonstrated by performance with correct form without cues. LTG: (to be met in 10 treatments)  1. Patient will improve UEFI to 0% in order to improve overall quality of life  2. ? Function: Patient will report being able to drive up to 45 min without pain in (R) shoulder in order to improve driving tolerance. 3. ? Strength: Patient will improve (B) shoulder and scapular stabilizer strength to 4+/5 in order to improve driving tolerance of (R) shoulder          Pt. Education:  [x] Yes  [] No  [x] Reviewed Prior HEP/Ed  Access Code: [email or web address]  URL: "[email or web address]. [email or web address]  Date: 08/25/2021  Prepared by: Amalia Hager    Exercises  Seated Upper Trapezius

## 2021-09-01 ENCOUNTER — HOSPITAL ENCOUNTER (OUTPATIENT)
Dept: PHYSICAL THERAPY | Facility: CLINIC | Age: 71
Setting detail: THERAPIES SERIES
Discharge: HOME | End: 2021-09-01
Payer: MEDICARE

## 2021-09-01 PROCEDURE — 97110 THERAPEUTIC EXERCISES: CPT

## 2021-09-01 NOTE — FLOWSHEET NOTE
[] [facility]) [facility] &  Therapy  [address]  [phone]  F: [phone] [] [address]  [employer] [address]   [address]  P: [phone]  F: [phone] [x] [address] &  Therapy  [address] [facility]  P: [phone]  F: [phone] [] [address]  P: [phone]  F: [phone] [] [address]  [facility]   [address]   [place]: [phone]  F: [phone]      Physical Therapy Daily Treatment Note    Date:  2021  Patient Name:  Latricia Cloud    :  1950  MRN: [id number]   Physician: [address]: [facility] Medicare  Medical Diagnosis:  Adhesive Capsulitis of (R) shoulder             Rehab Codes: M25.51  Onset date: 2021             Next 's appt.: PRN  Visit# / total visits: 4/10 (R) shoulder     Cancels/No Shows: 0/0    Subjective:    Pain:  [] Yes  [x] No Location: (L) hip Pain Rating: (0-10 scale) 0/10   Pain altered Tx:  [x] No  [] Yes  Action:  Comments: Patient reports no pain upon arrival to physical therapy today. Patient reports pain mainly occurs when resting (R) UE on middle console.    Objective:   Modalities:   Precautions:  Exercises:   Exercise Reps/ Time Weight/ Level Exercise Performed  Comments   UBE 6'                   (R) Shoulder       UT S 3x30\"      Levator Scap S 3x30\"             SL ER 2x10 2lbs     SL abd 2x10 2lbs     SL HAB 2x10 2lbs            IYT 2x10 2lbs     TB Rows 2x10 Lime     TB shoulder ext 2x10 Lime     TB ER/IR 2x10 Lime     TB ER walk outs       Wall Clocks 2x10 Blue     Resisted pull aparts 2x10 Lime     Resisted Diagonals  2x10 Lime     Wall ball x20 3.5lbs  Up/Down, S/S, CC, CCW   Resisted shoulder flexion 2x10 Orange              Other:     Treatment Charges: Mins Units   []  Modalities     [x]  Ther Exercise 51 3   []  Manual Therapy     [x]  Ther Activities     []  Aquatics     []  Vasocompression     []  Other     Total Treatment time 51 3       Assessment: [x] Progressing toward goals. Began session on UBE in order to warm up muscles. Continued with current shoulder POC per flowsheet above. Progressed scapular and shoulder strengthening ex on this  date with SL abduction, SL HAB and TB ER/IR ex per flowsheet above. VC for recall of previously completed ex and HEP. Patient tolerated added ex well with no increase in (R) shoulder pain. Updated patient's HEP in order to continue to improve scapular and shoulder strength at home. [] No change. [] Other:  [x] Patient would continue to benefit from skilled physical therapy services in order to decrease pain, improve UE/ scapular stabilizer strength    STG: (to be met in 5 treatments)  1. ? Pain: Patient will report pain as 0/10 in order to perform ADLs with ease   2. ? ROM: Patient will improve (B) cervical SB ROM to 45 deg to indicate improved length of (B) UT  3. Patient to be independent with home exercise program as demonstrated by performance with correct form without cues. LTG: (to be met in 10 treatments)  1. Patient will improve UEFI to 0% in order to improve overall quality of life  2. ? Function: Patient will report being able to drive up to 45 min without pain in (R) shoulder in order to improve driving tolerance. 3. ? Strength: Patient will improve (B) shoulder and scapular stabilizer strength to 4+/5 in order to improve driving tolerance of (R) shoulder          Pt. Education:  [x] Yes  [] No  [x] Reviewed Prior HEP/Ed  Access Code: [email or web address]  URL: [email or web address]  Date: 09/01/2021  Prepared by: Phoebe Gan    Exercises  Seated Upper Trapezius Stretch - 2 x daily - 7 x weekly - 3 reps - 30 hold  Seated Levator Scapulae Stretch - 2 x daily - 7 x weekly - 3 reps - 30 sec hold  Prone Single Arm Shoulder Y - 2 x daily - 5 x weekly - 3 sets - 10 reps  Prone Shoulder Horizontal Abduction - 2 x daily - 5 x weekly - 3 sets - 10 reps  Prone Shoulder Flexion - 2 x daily - 5 x weekly - 3 sets - 10 reps  Sidelying Shoulder External Rotation - 2 x daily - 5 x weekly - 3 sets - 10 reps  Standing Row with Anchored Resistance - 2 x daily - 7 x weekly - 3 sets - 10 reps  Shoulder extension with resistance - Neutral - 3 x daily - 7 x weekly - 3 sets - 10 reps  Wall Clock with Theraband - 2 x daily - 7 x weekly - 3 sets - 10 reps  Standing Shoulder Horizontal Abduction with Resistance - 2 x daily - 7 x weekly - 3 sets - 10 reps  Standing Shoulder Diagonal Horizontal Abduction 60/120 Degrees with Resistance - 2 x daily - 7 x weekly - 3 sets - 10 reps  Shoulder Elbow Flexion 90/90 with Band - 2 x daily - 7 x weekly - 3 sets - 10 reps  Standing Wall Ball Circles with Mini Swiss Ball - 2 x daily - 7 x weekly - 3 sets - 10 reps  Sidelying Shoulder Abduction with Resistance to 60 Degrees - 2 x daily - 7 x weekly - 3 sets - 10 reps  Sidelying Shoulder Horizontal Abduction - 2 x daily - 7 x weekly - 3 sets - 10 reps    Method of Education: [x] Verbal  [x] Demo  [x] Written  Updated HEP during this session, adding mat hip strengthening exercises with good understanding from patient. Comprehension of Education:  [x] Verbalizes understanding. [x] Demonstrates understanding. [x] Needs review. [] Demonstrates/verbalizes HEP/Ed previously given. Plan: [x] Continue current frequency toward long and short term goals. [x] Specific Instructions for subsequent treatments: Plan to see patient 1x/week due to decrease in shoulder pain and patient's work schedule. Updated HEP during this session to complete at home.        Time In: 9:02 am            Time Out: 9:57 am    Electronically signed by:  Joy Lazaro PT

## 2021-09-08 ENCOUNTER — HOSPITAL ENCOUNTER (OUTPATIENT)
Dept: PHYSICAL THERAPY | Facility: CLINIC | Age: 71
Setting detail: THERAPIES SERIES
Discharge: HOME | End: 2021-09-08
Payer: MEDICARE

## 2021-09-08 PROCEDURE — 97530 THERAPEUTIC ACTIVITIES: CPT

## 2021-09-08 PROCEDURE — 97110 THERAPEUTIC EXERCISES: CPT

## 2021-09-08 NOTE — PROGRESS NOTES
[] [facility]) [facility] &  Therapy  [address]  [phone]  F: [phone] [] [address]  "[employer] 36   [address]  P: [phone]  F: [phone] [x] [address] &  Therapy  [address]  P: [phone]  F: [phone] [] [address]  P: [phone]  F: [phone] [] [address]  [facility]   [address]   [place]: [phone]  F: [phone]      Physical Therapy Daily Treatment Note/Progress Note    Date:  2021  Patient Name:  Rosita Brar    :  1950  MRN: [id number]   Physician: [address]: [facility] Medicare  Medical Diagnosis:  Adhesive Capsulitis of (R) shoulder             Rehab Codes: M25.51  Onset date: 2021             Next 's appt.: PRN  Visit# / total visits: 5/10 (R) shoulder     Cancels/No Shows: 0/0    Subjective:    Pain:  [] Yes  [x] No Location: (L) hip Pain Rating: (0-10 scale) 0/10   Pain altered Tx:  [x] No  [] Yes  Action:  Comments: Patient reports no pain upon arrival to physical therapy today. Patient reports pain mainly occurs when resting (R) UE on middle console. Objective:     ROM  °A/P END FEEL STRENGTH     Left Right   Left Right   Shoulder Flex 150 140   5/5 5/5   Ext              175   5/5 5/5   ER @ 0 45 90 WFL WFL   5/5 4/5   IR WFL WFL   5/5 5/5   Supraspinatus       5/5 5/5   Infraspinatus       5/5 4/5   Serratus Ant       5/5 4+/5   Pectoralis             Lats             Mid Trap       4+/5 4/5   Lower Trap       3+/5 4/5   Elbow Flex. [facility] WFL   4+/5 4/5   [facility]. [facility]      Wrist Flex. [facility] WF      Wrist Ext.  [facility] WF                       Modalities:   Precautions:  Exercises:   Exercise Reps/ Time Weight/ Level Exercise Performed  Comments    UBE 6'      x               (R) Shoulder        UT S 3x30\"    x   Levator Scap S 3x30\"    x           SL ER 2x10 2lbs   x   SL abd 2x10 3lbs   x   SL HAB 2x10 2lbs   x   Serratus Punches 2x10 3lbs   x           IYT 2x10 3lbs   x   TB Rows 2x10 blue   x   TB shoulder ext 2x10 blue   x   TB ER/IR 2x10 blue   x   TB ER walk outs 2x10 blue   x   Wall Clocks 2x10 Blue      Resisted pull aparts 2x10 Lime      Resisted Diagonals  2x10 Lime      Wall ball x20 3.5lbs  Up/Down, S/S, CC, CCW    Resisted shoulder flexion 2x10 Orange                Other:     Treatment Charges: Mins Units   []  Modalities     [x]  Ther Exercise 45 3   []  Manual Therapy     [x]  Ther Activities 10 1   []  Aquatics     []  Vasocompression     []  Other     Total Treatment time 55 4       Assessment: [x] Progressing toward goals. Reassessed patient's goals during this session with good progress. Patient met 1/3 short term goals and 1/3 long term goals. Patient demonstrates improvement in cervical ROM, pain tolerance, driving tolerance and scapular/(R) shoulder strength. Patient would continue to benefit from skilled physical therapy in order to continue to improve pain tolerance, cervical ROM and scapular/(R) shoulder strength. Plan to continue with skilled physical therapy per current POC in order to continue to improve impairments and activity limitations. Continued with current POC during this session per flowsheet. Added serratus punches during this session in order to improve (R) scapulohumeral rhythm. Progressed SL, IYT and TB ex with increased resistance during this session. Patient tolerated added ex and resistance well with no increase in (R) shoulder pain. [] No change.      [] Other:  [x] Patient would continue to benefit from skilled physical therapy services in order to decrease pain, improve UE/ scapular stabilizer strength    STG: (to be met in 5 treatments)  1. ? Pain: Patient will report pain as 0/10 in order to perform ADLs with ease (9/8/21: Patient reports pain at the worst as a 2/10 with driving) (NOT MET)   2. ? ROM: Patient will improve (B) cervical SB ROM to 45 deg to indicate improved length of (B) UT (9/8/21: Patient demonstrates (B) cervical SB as 25 deg) (NOT MET)  3. Patient to be independent with home exercise program as demonstrated by performance with correct form without cues. (9/8/21: Patient reports that he completes his HEP 3x/week) (MET)   LTG: (to be met in 10 treatments)  1. Patient will improve UEFI to 0% in order to improve overall quality of life   2. ? Function: Patient will report being able to drive up to 45 min without pain in (R) shoulder in order to improve driving tolerance. (8/2/86: Patient reports he is able to drive up to one hour without (R) shoulder pain) (MET)  3. ? Strength: Patient will improve (B) shoulder and scapular stabilizer strength to 4+/5 in order to improve driving tolerance of (R) shoulder (9/8/21: See Flowsheet) (NOT MET)          Pt. Education:  [x] Yes  [] No  [x] Reviewed Prior HEP/Ed  Access Code: [email or web address]  URL: [email or web address]  Date: 09/01/2021  Prepared by: Katie Peter    Exercises  Seated Upper Trapezius Stretch - 2 x daily - 7 x weekly - 3 reps - 30 hold  Seated Levator Scapulae Stretch - 2 x daily - 7 x weekly - 3 reps - 30 sec hold  Prone Single Arm Shoulder Y - 2 x daily - 5 x weekly - 3 sets - 10 reps  Prone Shoulder Horizontal Abduction - 2 x daily - 5 x weekly - 3 sets - 10 reps  Prone Shoulder Flexion - 2 x daily - 5 x weekly - 3 sets - 10 reps  Sidelying Shoulder External Rotation - 2 x daily - 5 x weekly - 3 sets - 10 reps  Standing Row with Anchored Resistance - 2 x daily - 7 x weekly - 3 sets - 10 reps  Shoulder extension with resistance - Neutral - 3 x daily - 7 x weekly - 3 sets - 10 reps  Wall Clock with Theraband - 2 x daily - 7 x weekly - 3 sets - 10 reps  Standing Shoulder Horizontal Abduction with Resistance - 2 x daily - 7 x weekly - 3 sets - 10 reps  Standing Shoulder Diagonal Horizontal Abduction 60/120 Degrees with Resistance - 2 x daily - 7 x weekly - 3 sets - 10 reps  Shoulder Elbow Flexion 90/90 with Band - 2 x daily - 7 x weekly - 3 sets - 10 reps  Standing Wall [employer] with Mini Swiss Ball - 2 x daily - 7 x weekly - 3 sets - 10 reps  Sidelying Shoulder Abduction with Resistance to 60 Degrees - 2 x daily - 7 x weekly - 3 sets - 10 reps  Sidelying Shoulder Horizontal Abduction - 2 x daily - 7 x weekly - 3 sets - 10 reps    Method of Education: [x] Verbal  [x] Demo  [x] Written  Updated HEP during this session, adding mat hip strengthening exercises with good understanding from patient. Comprehension of Education:  [x] Verbalizes understanding. [x] Demonstrates understanding. [x] Needs review. [] Demonstrates/verbalizes HEP/Ed previously given. Plan: [x] Continue current frequency toward long and short term goals. [x] Specific Instructions for subsequent treatments: Plan to see patient 1x/week due to decrease in shoulder pain and patient's work schedule. Updated HEP during this session to complete at home.        Time In: 9:03 am            Time Out: 10:00 am    Electronically signed by:  Ang Joseph PT

## 2021-09-15 ENCOUNTER — APPOINTMENT (OUTPATIENT)
Dept: PHYSICAL THERAPY | Facility: CLINIC | Age: 71
End: 2021-09-15
Payer: MEDICARE

## 2021-09-17 ENCOUNTER — HOSPITAL ENCOUNTER (OUTPATIENT)
Dept: PHYSICAL THERAPY | Facility: CLINIC | Age: 71
Setting detail: THERAPIES SERIES
Discharge: HOME | End: 2021-09-17
Payer: MEDICARE

## 2021-09-17 PROCEDURE — 97110 THERAPEUTIC EXERCISES: CPT

## 2021-09-17 NOTE — FLOWSHEET NOTE
[] [facility]) [facility] &  Therapy  [address]  [phone]  F: [phone] [] [address]  [address]   [address]  P: [phone]  F: [phone] [x] [address] &  Therapy  [address] [facility]  P: [phone]  F: [phone] [] [address]  P: [phone]  F: [phone] [] [address]  [facility]   [address]   [place]: [phone]  F: [phone]      Physical Therapy Daily Treatment Note    Date:  2021  Patient Name:  Mabel Mcgrath    :  1950  MRN: [id number]   Physician: [address]: [facility] Medicare  Medical Diagnosis:  Adhesive Capsulitis of (R) shoulder             Rehab Codes: M25.51  Onset date: 2021             Next 's appt.: PRN  Visit# / total visits: 6/10 (R) shoulder     Cancels/No Shows: 0/0    Subjective:    Pain:  [] Yes  [x] No Location: (L) hip Pain Rating: (0-10 scale) 0/10   Pain altered Tx:  [x] No  [] Yes  Action:  Comments: Patient reports to physical therapy with no changes in status at this time.    Objective:     Modalities:   Precautions:  Exercises:   Exercise Reps/ Time Weight/ Level Exercise Performed  Comments    UBE 6'      x               (R) Shoulder        UT S 3x30\"    x   Levator Scap S 3x30\"    x           SL ER 3x10 2lbs   x   SL abd 3x10 3lbs   x   SL HAB 3x10 2lbs   x   Serratus Punches 3x10 3lbs   x   Prone extension 2x10 2lbs   x   Prone scapular depression 2x10    x           IYT 2x10 3lbs      TB Rows 2x10 blue   x   TB shoulder ext 2x10 blue   x   TB ER/IR 2x10 blue   x   TB ER walk outs 2x10 blue   x   Wall Clocks 2x10 Blue   x   Resisted pull aparts 2x10 Blue   x   Resisted Diagonals  2x10 Blue   x   Wall ball x20 3.5lbs  Up/Down, S/S, CC, CCW x   Resisted shoulder flexion 2x10 Orange   x             Other:     Treatment Charges: Mins Units   []  Modalities     [x]  Ther Exercise 50 3   []  Manual Therapy     []  Ther Activities     []  Aquatics     []  Vasocompression     []  Other     Total Treatment time 50 3       Assessment: [x] Progressing toward goals. Continued with current POC per flowsheet above. Began session on UBE followed with stretching to improve dynamic/ static mobility within the (R) shoulder and cervical spine. Initiated prone shoulder ext and scapular depression ex in order to continue to improve (R) shoulder stability and scapulohumeral rhythm. Progressed resisted pull apart and SL ex during this session with increased resistance and repetitions. Patient tolerated added ex well with no increase in (R) shoulder pain. [] No change. [] Other:  [x] Patient would continue to benefit from skilled physical therapy services in order to decrease pain, improve UE/ scapular stabilizer strength    STG: (to be met in 5 treatments)  1. ? Pain: Patient will report pain as 0/10 in order to perform ADLs with ease (9/8/21: Patient reports pain at the worst as a 2/10 with driving) (NOT MET)   2. ? ROM: Patient will improve (B) cervical SB ROM to 45 deg to indicate improved length of (B) UT (9/8/21: Patient demonstrates (B) cervical SB as 25 deg) (NOT MET)  3. Patient to be independent with home exercise program as demonstrated by performance with correct form without cues. (9/8/21: Patient reports that he completes his HEP 3x/week) (MET)   LTG: (to be met in 10 treatments)  1.  Patient will improve UEFI to 0% in order to improve overall quality of life   2. ? Function: Patient will report being able to drive up to 45 min without pain in (R) shoulder in order to improve driving tolerance. (1/9/71: Patient reports he is able to drive up to one hour without (R) shoulder pain) (MET)  3. ? Strength: Patient will improve (B) shoulder and scapular stabilizer strength to 4+/5 in order to improve driving tolerance of (R) shoulder (9/8/21: See Flowsheet) (NOT MET)          Pt. Education:  [x] Yes  [] No  [x] Reviewed Prior HEP/Ed  Access Code: [email or web address]  URL: [email or web address]  Date: 09/01/2021  Prepared by: Emily Samuel    Exercises  Seated Upper Trapezius Stretch - 2 x daily - 7 x weekly - 3 reps - 30 hold  Seated Levator Scapulae Stretch - 2 x daily - 7 x weekly - 3 reps - 30 sec hold  Prone Single Arm Shoulder Y - 2 x daily - 5 x weekly - 3 sets - 10 reps  Prone Shoulder Horizontal Abduction - 2 x daily - 5 x weekly - 3 sets - 10 reps  Prone Shoulder Flexion - 2 x daily - 5 x weekly - 3 sets - 10 reps  Sidelying Shoulder External Rotation - 2 x daily - 5 x weekly - 3 sets - 10 reps  Standing Row with Anchored Resistance - 2 x daily - 7 x weekly - 3 sets - 10 reps  Shoulder extension with resistance - Neutral - 3 x daily - 7 x weekly - 3 sets - 10 reps  Wall Clock with Theraband - 2 x daily - 7 x weekly - 3 sets - 10 reps  Standing Shoulder Horizontal Abduction with Resistance - 2 x daily - 7 x weekly - 3 sets - 10 reps  Standing Shoulder Diagonal Horizontal Abduction 60/120 Degrees with Resistance - 2 x daily - 7 x weekly - 3 sets - 10 reps  Shoulder Elbow Flexion 90/90 with Band - 2 x daily - 7 x weekly - 3 sets - 10 reps  Standing Wall Ball Circles with Mini Swiss Ball - 2 x daily - 7 x weekly - 3 sets - 10 reps  Sidelying Shoulder Abduction with Resistance to 60 Degrees - 2 x daily - 7 x weekly - 3 sets - 10 reps  Sidelying Shoulder Horizontal Abduction - 2 x daily - 7 x weekly - 3 sets - 10 reps    Method of Education: [x] Verbal  [x] Demo  [x] Written  Updated HEP during this session, adding mat hip strengthening exercises with good understanding from patient. Comprehension of Education:  [x] Verbalizes understanding. [x] Demonstrates understanding. [x] Needs review. [] Demonstrates/verbalizes HEP/Ed previously given.      Plan: [x] Continue current frequency toward long and short term goals. [x] Specific Instructions for subsequent treatments: Plan to see patient 1x/week due to decrease in shoulder pain and patient's work schedule. Updated HEP during this session to complete at home.        Time In: 10:10 am            Time Out: 11:05 am    Electronically signed by:  Luis Manuel Lim PT

## 2021-09-22 ENCOUNTER — HOSPITAL ENCOUNTER (OUTPATIENT)
Dept: PHYSICAL THERAPY | Facility: CLINIC | Age: 71
Setting detail: THERAPIES SERIES
Discharge: HOME | End: 2021-09-22
Payer: MEDICARE

## 2021-09-22 PROCEDURE — 97110 THERAPEUTIC EXERCISES: CPT

## 2021-09-22 NOTE — FLOWSHEET NOTE
Resisted Diagonals  2x10 Blue   x   Wall ball x20 3.5lbs  Up/Down, S/S, CC, CCW x   Resisted shoulder flexion 2x10 Orange   x   Standing shoulder flexion, scaption, abduction 2x10 2lbs   x     Other:     Treatment Charges: Mins Units   []  Modalities     [x]  Ther Exercise 48 3   []  Manual Therapy     []  Ther Activities     []  Aquatics     []  Vasocompression     []  Other     Total Treatment time 48 3       Assessment: [x] Progressing toward goals. Continued with current POC per flowsheet above. Unable to complete all ex due to time constraints. Began session on bike utilizing UE only due to UBE being taken. Followed with stretching to improve dynamic/ static mobility within the (R) shoulder and cervical spine. Progressed SL ex and wall clocks with increased resistance during this session in order to improve (R) shoulder and scapular strength. Added standing shoulder flexion, scaption and abduction during this session in order to further improve (R) shoulder strength and stability. Patient tolerated added ex well with no increase in (R) shoulder pain. Plan to reassess patient next visit and discuss discharge. [] No change. [] Other:  [x] Patient would continue to benefit from skilled physical therapy services in order to decrease pain, improve UE/ scapular stabilizer strength    STG: (to be met in 5 treatments)  1. ? Pain: Patient will report pain as 0/10 in order to perform ADLs with ease (9/8/21: Patient reports pain at the worst as a 2/10 with driving) (NOT MET)   2. ? ROM: Patient will improve (B) cervical SB ROM to 45 deg to indicate improved length of (B) UT (9/8/21: Patient demonstrates (B) cervical SB as 25 deg) (NOT MET)  3. Patient to be independent with home exercise program as demonstrated by performance with correct form without cues. (9/8/21: Patient reports that he completes his HEP 3x/week) (MET)   LTG: (to be met in 10 treatments)  1.  Patient will improve UEFI to 0% in order to improve overall quality of life   2. ? Function: Patient will report being able to drive up to 45 min without pain in (R) shoulder in order to improve driving tolerance. (6/2/47: Patient reports he is able to drive up to one hour without (R) shoulder pain) (MET)  3. ? Strength: Patient will improve (B) shoulder and scapular stabilizer strength to 4+/5 in order to improve driving tolerance of (R) shoulder (9/8/21: See Flowsheet) (NOT MET)          Pt. Education:  [x] Yes  [] No  [x] Reviewed Prior HEP/Ed  Access Code: [email or web address]  URL: [email or web address]  Date: 09/01/2021  Prepared by: Walter Garcia    Exercises  Seated Upper Trapezius Stretch - 2 x daily - 7 x weekly - 3 reps - 30 hold  Seated Levator Scapulae Stretch - 2 x daily - 7 x weekly - 3 reps - 30 sec hold  Prone Single Arm Shoulder Y - 2 x daily - 5 x weekly - 3 sets - 10 reps  Prone Shoulder Horizontal Abduction - 2 x daily - 5 x weekly - 3 sets - 10 reps  Prone Shoulder Flexion - 2 x daily - 5 x weekly - 3 sets - 10 reps  Sidelying Shoulder External Rotation - 2 x daily - 5 x weekly - 3 sets - 10 reps  Standing Row with Anchored Resistance - 2 x daily - 7 x weekly - 3 sets - 10 reps  Shoulder extension with resistance - Neutral - 3 x daily - 7 x weekly - 3 sets - 10 reps  Wall Clock with Theraband - 2 x daily - 7 x weekly - 3 sets - 10 reps  Standing Shoulder Horizontal Abduction with Resistance - 2 x daily - 7 x weekly - 3 sets - 10 reps  Standing Shoulder Diagonal Horizontal Abduction 60/120 Degrees with Resistance - 2 x daily - 7 x weekly - 3 sets - 10 reps  Shoulder Elbow Flexion 90/90 with Band - 2 x daily - 7 x weekly - 3 sets - 10 reps  Standing Wall Ball Circles with Mini Swiss Ball - 2 x daily - 7 x weekly - 3 sets - 10 reps  Sidelying Shoulder Abduction with Resistance to 60 Degrees - 2 x daily - 7 x weekly - 3 sets - 10 reps  Sidelying Shoulder Horizontal Abduction - 2 x daily - 7 x weekly - 3 sets - 10 reps    Method of Education: [x] Verbal  [x] Demo  [x] Written  Updated HEP during this session, adding mat hip strengthening exercises with good understanding from patient. Comprehension of Education:  [x] Verbalizes understanding. [x] Demonstrates understanding. [x] Needs review. [] Demonstrates/verbalizes HEP/Ed previously given. Plan: [x] Continue current frequency toward long and short term goals. [x] Specific Instructions for subsequent treatments: Plan to see patient 1x/week due to decrease in shoulder pain and patient's work schedule. Updated HEP during this session to complete at home.        Time In: 8:10 am            Time Out: 9:00 am    Electronically signed by:  Ang Joseph, PT

## 2021-09-29 ENCOUNTER — HOSPITAL ENCOUNTER (OUTPATIENT)
Dept: PHYSICAL THERAPY | Facility: CLINIC | Age: 71
Setting detail: THERAPIES SERIES
Discharge: HOME | End: 2021-09-29
Payer: MEDICARE

## 2021-09-29 PROCEDURE — 97110 THERAPEUTIC EXERCISES: CPT

## 2021-09-29 PROCEDURE — 97530 THERAPEUTIC ACTIVITIES: CPT

## 2021-09-29 NOTE — DISCHARGE SUMMARY
Time Weight/ Level Exercise Performed  Comments    UBE 6'      x               (R) Shoulder        UT S 3x30\"    x   Levator Scap S 3x30\"    x           SL ER 3x10 3lbs      SL abd 3x10 3lbs      SL HAB 3x10 3lbs      Serratus Punches 3x10 3lbs      Prone extension 2x10 2lbs      Prone scapular depression 2x10               IYT 2x10 3lbs      TB Rows 2x10 blue      TB shoulder ext 2x10 blue      TB ER/IR 2x10 blue      TB ER walk outs 2x10 blue      Wall Clocks 2x10 [place]   x   Resisted pull aparts 2x10 Blue   x   Resisted Diagonals  2x10 Blue   x   Wall ball x20 3.5lbs  Up/Down, S/S, CC, CCW    Resisted shoulder flexion 2x10 Orange   x   Standing shoulder flexion, scaption, abduction 2x10 2lbs   x     Other:     Treatment Charges: Mins Units   []  Modalities     [x]  Ther Exercise 20 1   []  Manual Therapy     [x]  Ther Activities 26 2   []  Aquatics     []  Vasocompression     []  Other     Total Treatment time 46 3       Assessment: [x] Reassessed patient's goals during this session with good progress toward goals. Patient met 2/3 short term goals and 2/3 long term goals. Patient demonstrated significant improvements in pain tolerance, cervical ROM, driving tolerance and (B) shoulder/scapular strength. Patient can continue to improve in cervical ROM and (B) shoulder/scapular strength. Patient to be discharged at this time due to making significant improvements in (B) shoulders and will continue HEP at home. Updated patient's HEP and gave patient TB to complete ex at home. Continued with current POC per flowsheet above. VC for wall clocks and diagonal pulls in order to improve scapular activation with good follow through. [] No change.      [] Other:  [] Patient would continue to benefit from skilled physical therapy services in order to decrease pain, improve UE/ scapular stabilizer strength    STG: (to be met in 5 treatments)  1. ? Pain: Patient will report pain as 0/10 in order to perform ADLs with ease Shoulder Horizontal Abduction with Resistance - 2 x daily - 7 x weekly - 3 sets - 10 reps  Standing Shoulder Diagonal Horizontal Abduction 60/120 Degrees with Resistance - 2 x daily - 7 x weekly - 3 sets - 10 reps  Shoulder Elbow Flexion 90/90 with Band - 2 x daily - 7 x weekly - 3 sets - 10 reps  Standing Wall [employer] with Mini Swiss Ball - 2 x daily - 7 x weekly - 3 sets - 10 reps  Sidelying Shoulder Abduction with Resistance to 60 Degrees - 2 x daily - 7 x weekly - 3 sets - 10 reps  Sidelying Shoulder Horizontal Abduction - 2 x daily - 7 x weekly - 3 sets - 10 reps  Standing Scapular Depression - 2 x daily - 7 x weekly - 3 sets - 10 reps  Prone Shoulder Extension - Single Arm - 2 x daily - 7 x weekly - 3 sets - 10 reps  Method of Education: [x] Verbal  [x] Demo  [x] Written    Comprehension of Education:  [x] Verbalizes understanding. [x] Demonstrates understanding. [x] Needs review. [] Demonstrates/verbalizes HEP/Ed previously given. Treatment to Date:  [x] Therapeutic Exercise    [] Modalities:  [x] Therapeutic Activity    [] Ultrasound  [] Electrical Stimulation  [] Gait Training     [] Massage       [] Lumbar/Cervical Traction  [] Neuromuscular Re-education [] Cold/hotpack [] Iontophoresis: 4 mg/mL  [] Instruction in Home Exercise Program                     Dexamethasone Sodium  [] Manual Therapy             Phosphate 40-80 mAmin  [] Aquatic Therapy                   [] Vasocompression/    [] Other:             Game Ready    Discharge Status:     [x] Pt recovered from conditions. Treatment goals were met. [] Pt received maximum benefit. No further therapy indicated at this time. [x] Pt to continue exercise/home instructions independently. [] Therapy interrupted due to:    [] Pt has 2 or more no shows/cancels, is discontinued per our policy. [] Pt has completed prescribed number of treatment sessions.      [] Other:         Electronically signed by Renée Pedro PT on 9/29/2021 at 10:58 AM      If you have any questions or concerns, please don't hesitate to call.   Thank you for your referral.      Time In: 10:05 am            Time Out: 10:55 am

## 2024-10-02 LAB — PROSTATE SPECIFIC ANTIGEN: 3.19 NG/ML
